# Patient Record
Sex: FEMALE | Race: WHITE
[De-identification: names, ages, dates, MRNs, and addresses within clinical notes are randomized per-mention and may not be internally consistent; named-entity substitution may affect disease eponyms.]

---

## 2017-04-25 ENCOUNTER — HOSPITAL ENCOUNTER (OUTPATIENT)
Dept: HOSPITAL 80 - FIMAGING | Age: 77
End: 2017-04-25
Attending: GENERAL ACUTE CARE HOSPITAL
Payer: COMMERCIAL

## 2017-04-25 DIAGNOSIS — Z12.31: Primary | ICD-10-CM

## 2017-04-25 PROCEDURE — G0202 SCR MAMMO BI INCL CAD: HCPCS

## 2017-11-05 ENCOUNTER — HOSPITAL ENCOUNTER (INPATIENT)
Dept: HOSPITAL 80 - FED | Age: 77
LOS: 5 days | Discharge: HOME HEALTH | DRG: 193 | End: 2017-11-10
Attending: INTERNAL MEDICINE | Admitting: INTERNAL MEDICINE
Payer: COMMERCIAL

## 2017-11-05 DIAGNOSIS — E11.9: ICD-10-CM

## 2017-11-05 DIAGNOSIS — E78.5: ICD-10-CM

## 2017-11-05 DIAGNOSIS — D63.8: ICD-10-CM

## 2017-11-05 DIAGNOSIS — E66.9: ICD-10-CM

## 2017-11-05 DIAGNOSIS — M81.0: ICD-10-CM

## 2017-11-05 DIAGNOSIS — J96.01: ICD-10-CM

## 2017-11-05 DIAGNOSIS — J10.00: Primary | ICD-10-CM

## 2017-11-05 DIAGNOSIS — J45.909: ICD-10-CM

## 2017-11-05 DIAGNOSIS — Z79.84: ICD-10-CM

## 2017-11-05 LAB
% IMMATURE GRANULYOCYTES: 0 % (ref 0–1.1)
ABSOLUTE IMMATURE GRANULOCYTES: 0 10^3/UL (ref 0–0.1)
ABSOLUTE NRBC COUNT: 0 10^3/UL (ref 0–0.01)
ADD DIFF?: NO
ADD MORPH?: NO
ADD SCAN?: NO
ANION GAP SERPL CALC-SCNC: 11 MEQ/L (ref 8–16)
APTT BLD: 29.2 SEC (ref 23–38)
ATYPICAL LYMPHOCYTE FLAG: 0 (ref 0–99)
BILIRUB SERPL-MCNC: 0.4 MG/DL (ref 0.1–1.4)
CALCIUM SERPL-MCNC: 9.1 MG/DL (ref 8.5–10.4)
CHLORIDE SERPL-SCNC: 102 MEQ/L (ref 97–110)
CO2 SERPL-SCNC: 25 MEQ/L (ref 22–31)
CREAT SERPL-MCNC: 0.9 MG/DL (ref 0.6–1)
ERYTHROCYTE [DISTWIDTH] IN BLOOD BY AUTOMATED COUNT: 12.4 % (ref 11.5–15.2)
FRAGMENT RBC FLAG: 0 (ref 0–99)
GFR SERPL CREATININE-BSD FRML MDRD: > 60 ML/MIN/{1.73_M2}
GLUCOSE SERPL-MCNC: 263 MG/DL (ref 70–100)
HCT VFR BLD CALC: 35.4 % (ref 38–47)
HGB BLD-MCNC: 11.8 G/DL (ref 12.6–16.3)
INR PPP: 0.98 (ref 0.83–1.16)
LEFT SHIFT FLG: 0 (ref 0–99)
LIPEMIA HEMOLYSIS FLAG: 80 (ref 0–99)
MCH RBC BLDCO QN: 32.3 PG (ref 27.9–34.1)
MCHC RBC AUTO-ENTMCNC: 33.3 G/DL (ref 32.4–36.7)
MCV RBC AUTO: 97 FL (ref 81.5–99.8)
NRBC-AUTO%: 0 % (ref 0–0.2)
PLATELET # BLD: 145 10^3/UL (ref 150–400)
PLATELET CLUMPS FLAG: 0 (ref 0–99)
PMV BLD AUTO: 11.7 FL (ref 8.7–11.7)
POTASSIUM SERPL-SCNC: 5 MEQ/L (ref 3.5–5.2)
PROTHROMBIN TIME: 12.9 SEC (ref 12–15)
RBC # BLD AUTO: 3.65 10^6/UL (ref 4.18–5.33)
SODIUM SERPL-SCNC: 138 MEQ/L (ref 134–144)
SPECIMEN HEMOLYSIS: 114
TROPONIN I SERPL-MCNC: < 0.012 NG/ML (ref 0–0.03)

## 2017-11-05 RX ADMIN — ACETAMINOPHEN PRN MG: 325 TABLET ORAL at 22:42

## 2017-11-05 RX ADMIN — SODIUM CHLORIDE SCH MLS: 900 INJECTION, SOLUTION INTRAVENOUS at 23:56

## 2017-11-05 NOTE — CPEKG
Heart Rate: 92

RR Interval: 652

P-R Interval: 148

QRSD Interval: 70

QT Interval: 356

QTC Interval: 441

P Axis: 47

QRS Axis: -14

T Wave Axis: 49

EKG Severity - NORMAL ECG -

EKG Impression: SINUS RHYTHM

Electronically Signed By: Kayla Arriola 05-Nov-2017 21:48:21

## 2017-11-06 LAB
ABSOLUTE NRBC COUNT: 0 10^3/UL (ref 0–0.01)
ADD DIFF?: YES
ADD MORPH?: NO
ADD SCAN?: NO
ANION GAP SERPL CALC-SCNC: 8 MEQ/L (ref 8–16)
ATYPICAL LYMPHOCYTE FLAG: 0 (ref 0–99)
CALCIUM SERPL-MCNC: 8.5 MG/DL (ref 8.5–10.4)
CHLORIDE SERPL-SCNC: 108 MEQ/L (ref 97–110)
CO2 SERPL-SCNC: 27 MEQ/L (ref 22–31)
COLOR UR: YELLOW
CREAT SERPL-MCNC: 0.8 MG/DL (ref 0.6–1)
ERYTHROCYTE [DISTWIDTH] IN BLOOD BY AUTOMATED COUNT: 12.6 % (ref 11.5–15.2)
FRAGMENT RBC FLAG: 0 (ref 0–99)
GFR SERPL CREATININE-BSD FRML MDRD: > 60 ML/MIN/{1.73_M2}
GLUCOSE SERPL-MCNC: 68 MG/DL (ref 70–100)
HCT VFR BLD CALC: 33.1 % (ref 38–47)
HGB BLD-MCNC: 10.6 G/DL (ref 12.6–16.3)
LEFT SHIFT FLG: 0 (ref 0–99)
LIPEMIA HEMOLYSIS FLAG: 80 (ref 0–99)
MCH RBC BLDCO QN: 31.8 PG (ref 27.9–34.1)
MCHC RBC AUTO-ENTMCNC: 32 G/DL (ref 32.4–36.7)
MCV RBC AUTO: 99.4 FL (ref 81.5–99.8)
NITRITE UR QL STRIP: NEGATIVE
NRBC-AUTO%: 0 % (ref 0–0.2)
PH UR STRIP: 5 [PH] (ref 5–7.5)
PLATELET # BLD EST: (no result) 10*3/UL
PLATELET # BLD: 128 10^3/UL (ref 150–400)
PLATELET CLUMPS FLAG: 0 (ref 0–99)
PMV BLD AUTO: 11.7 FL (ref 8.7–11.7)
POTASSIUM SERPL-SCNC: 4.3 MEQ/L (ref 3.5–5.2)
RBC # BLD AUTO: 3.33 10^6/UL (ref 4.18–5.33)
RBC #/AREA URNS HPF: (no result) /HPF (ref 0–3)
RBC MORPH BLD: NORMAL
SODIUM SERPL-SCNC: 143 MEQ/L (ref 134–144)
SP GR UR STRIP: 1.01 (ref 1–1.03)
WBC #/AREA URNS HPF: (no result) /HPF (ref 0–3)

## 2017-11-06 RX ADMIN — OSELTAMIVIR PHOSPHATE SCH MG: 6 POWDER, FOR SUSPENSION ORAL at 11:44

## 2017-11-06 RX ADMIN — SODIUM CHLORIDE SCH MLS: 900 INJECTION, SOLUTION INTRAVENOUS at 15:53

## 2017-11-06 RX ADMIN — ACETAMINOPHEN PRN MG: 325 TABLET ORAL at 20:03

## 2017-11-06 RX ADMIN — GLIMEPIRIDE SCH MG: 2 TABLET ORAL at 08:50

## 2017-11-06 RX ADMIN — BENZONATATE PRN MG: 100 CAPSULE, LIQUID FILLED ORAL at 19:52

## 2017-11-06 RX ADMIN — ENOXAPARIN SODIUM SCH MG: 100 INJECTION SUBCUTANEOUS at 08:49

## 2017-11-06 RX ADMIN — ATORVASTATIN CALCIUM SCH MG: 20 TABLET, FILM COATED ORAL at 20:03

## 2017-11-06 RX ADMIN — OSELTAMIVIR PHOSPHATE SCH MG: 6 POWDER, FOR SUSPENSION ORAL at 19:53

## 2017-11-06 NOTE — PDMN
Medical Necessity


Medical necessity: est los>2mn for viral URI, hypoxia, and SIRS criteria; 

requires  supplemental O2, PT/OT, follow cx's, IVF; comorbid DM, afib on AC, 

asthma, hld, cognitive  delay, hx MVR; per H&P and order 11/5/17

## 2017-11-06 NOTE — HOSPPROG
Hospitalist Progress Note


Assessment/Plan: 





#Influenza A: Tamiflu, cough suppressants





#Acute hypoxic resp failure: due to above. No PNA on xray. Albuterol





#Cognitive delay: have 24hr failure





#Type 2 DM: resume home meds





#Diet: regular





#Disp cont inpt admission with acute hypoxia, requiring pulse ox and nebs


Subjective: SOB, cough


Objective: 


 Vital Signs











Temp Pulse Resp BP Pulse Ox


 


 37.1 C   81   18   141/80 H  96 


 


 11/06/17 07:40  11/06/17 11:17  11/06/17 11:17  11/06/17 11:17  11/06/17 11:17








 Laboratory Results





 11/06/17 05:15 





 11/06/17 05:15 





 











 11/05/17 11/06/17 11/07/17





 05:59 05:59 05:59


 


Intake Total  1531 


 


Balance  1531 








 











PT  12.9 SEC (12.0-15.0)   11/05/17  19:30    


 


INR  0.98  (0.83-1.16)   11/05/17  19:30    














- Physical Exam


Constitutional: no apparent distress


Eyes: PERRL


Ears, Nose, Mouth, Throat: dry mucous membranes


Cardiovascular: regular rate and rhythym, no murmur, rub, or gallop


Respiratory: rhonchi


Gastrointestinal: normoactive bowel sounds


Genitourinary: no bladder fullness


Skin: warm


Musculoskeletal: full muscle strength


Neurologic: AAOx3


Psychiatric: interacting appropriately





ICD10 Worksheet


Patient Problems: 


 Problems











Problem Status Onset


 


Cough Acute  


 


Hypoxemia Acute  


 


Upper respiratory infection Acute  


 


Hypoglycemia Acute

## 2017-11-06 NOTE — GHP
[f 
rep st]



                                                            HISTORY AND PHYSICAL





DATE OF ADMISSION:  11/05/2017



SOURCE:  Patient provides history, is fairly reliable.  Family at bedside 
supplements history.  EMR was also reviewed and case discussed with ED provider.



HISTORY OF PRESENT ILLNESS:  This is a very pleasant 77-year-old female with 
past medical history significant for diabetes type 2, asthma, obesity, 
osteoporosis, hyperlipidemia, cognitive delay who presents to the emergency 
department today with complaints of 3 day history of cough, rhinorrhea, 
congestion, fevers up to 102 at her group home and some nausea without 
vomiting.  Patient denies any shortness of breath or audible wheezing.  She 
reports a cough occasionally productive of white phlegm.  She has multiple sick 
contacts at her group home with similar symptoms.  Patient has received her flu 
vaccine.  Patient normally does not require any oxygen; however, in the 
emergency department patient was noted to be 86% on room air in the triage.



REVIEW OF SYSTEMS:  Negative except as noted above in HPI.



ALLERGIES:  No known drug allergies.



HOME MEDICATIONS:  Tylenol 325 b.i.d. p.r.n., Metformin 1000 mg p.o. b.i.d. 
with meals, vitamin D3 1000 units p.o. daily, Fosamax 70 mg p.o. weekly on 
Monday at 7, Simvastatin 40 mg p.o. at h.s., multivitamin 1 tab p.o. daily, 
Amaryl 2 mg p.o. daily, Albuterol inhaler HFA 1-2 puffs p.o. q.4 hours p.r.n. 
for shortness of breath and magnesium and calcium supplements.



PAST MEDICAL HISTORY:  Significant for diabetes type 2, cognitive delay, 
hyperlipidemia, asthma, obesity with history of aspiration pneumonitis on past 
history stays.  This is secondary to history of esophageal ring status post 
dilation and skin cancer.



PAST SURGICAL HISTORY:  Significant for EGD with dilation.



FAMILY HISTORY:  Unknown.  Patient is adopted.



SOCIAL HISTORY:  Patient resides in a group home with caregivers.  She does use 
a walker at baseline but remains fairly independent otherwise.  She does not 
smoke, drink or do drugs.



CODE STATUS:  Full.



PHYSICAL EXAMINATION:  VITAL SIGNS:  On arrival to the ER: Blood pressure 114/68
, heart rate 103, respiratory rate 20, O2 sat 86% on room air, temperature 
38.1.  On the floor:  Blood pressure 115/64, heart rate 74, respiratory rate 16
, O2 98% on 2 L by nasal cannula with a temperature of 36.4. 

GENERAL:  No acute distress.  Pleasant, frail, clinically ill appearing elderly 
female is lying quietly in bed but easily wakes to name.  She is pleasant and 
cooperative.  HEAD:  Head normocephalic, atraumatic.  EYES:  Extraocular 
muscles grossly intact but exam is limited secondary to patient not being able 
to follow commands.  No scleral icterus or conjunctival injection.  Pupils are 
equal, reactive to light bilaterally and symmetric.  No scleral icterus or 
conjunctival injection.  ENT:  Mucous membranes appear slightly dry.  No 
oropharyngeal erythema or exudates.  NECK:  Supple.  Trachea midline.  CV:  
Regular rate and rhythm.  No murmurs, rubs or gallops appreciated.  RESPIRATORY
:  Lungs are clear to auscultation bilaterally.  No wheezes, rales or rhonchi.  
Patient with intermittent cough.  ABDOMEN:  Obese, soft, nontender to 
palpation.  No rebound, guarding or masses appreciated.  :  No suprapubic 
tenderness to palpation.  No Sanchez in place.  MUSCULOSKELETAL:  Patient with 
generalized weakness but able to move all extremities while lying in bed.  NEURO
:  Cranial nerve exam is limited secondary to patient's inability follow 
instructions but grossly normal.  No facial drooping.  Moves all extremities.  
Sensation intact upper and lower extremities.  PSYCH:  Patient is quite 
cooperative and pleasant.  She is awake, alert and oriented to person, place.  
She is aware of her current medical problems.



LABORATORY DATA:  WBC 3.52, H and H 11.4 and 35.4, platelet count is 145.  No 
bands.  PT is 12.9, INR 0.98, PTT is 29.2.  VBG lactic acid 1.8.  Sodium 138, 
potassium 5.0, chloride 102, CO2 is 25, BUN 24, creatinine 0.9, GFR of greater 
than 60, glucose 263, calcium 9.1, total bili 0.4, troponin is negative.  
Specimen is noted to be hemolyzed.  Flu AB is negative.  Respiratory panel is 
pending.  Blood cultures x2 pending.



IMAGING STUDIES:  

1.  Electrocardiogram reviewed myself showing sinus tachy in the 190s.  No 
acute ST changes.  QTc 441.  

2.  Chest x-ray image and report was reviewed myself showing mild dependent 
edema or atelectasis suspected of the lung bases, otherwise no active 
cardiopulmonary disease is seen.



ASSESSMENT AND PLAN:  A pleasant 77-year-old female with history of asthma, 
diabetes now with complaints of fevers, chills, cough and rhinorrhea.

1.  Viral URI.  Patient with multiple sick contacts.  PCR panel is still 
pending.  Blood cultures were ordered.  Patient without any evidence of 
pneumonia on chest x-ray.  Some dependent atelectasis is noted.  Supportive 
care will be started.  Patient will not be given any antibiotics at this time.  
She as continuous need for oxygen.

2.  Hypoxia.  Patient still requiring a few liters of oxygen supplementation 
secondary to viral illness as above.  She does not have any evidence of asthma 
exacerbation at this time.  There is no wheezing or diminished breath sounds.  
Continue titrating oxygen as tolerated.  Incentive spirometry will also be 
ordered.  

3.  SIRS.  Patient meets criteria with fever, tachycardia, tachypnea, 
leukopenia.  Patient's lactate is within normal limits, however, continue with 
some IV fluid hydration.  SIRS criteria has improved since arrival to the 
floor.  

4.  Anemia.  Likely of chronic disease.  Patient without any evidence of active 
bleeding.  Will continue to monitor CBC.

5.  Thrombocytopenia.  Likely related to acute illness.  Continue to monitor.

6.  Diabetes type 2 with elevated blood sugars.  Resume patient's Metformin and 
glimepiride.  For now she may require insulin sliding scale but will reassess 
in the morning.  Add on Accu-Cheks.  

7.  Asthma.  No evidence of exacerbation.  Continue with Albuterol nebulizer 
p.r.n.  

8.  Hyperlipidemia.  Continue statin.

9.  Cognitive delay.  Patient makes her own decisions but has support.

10.  Fluid, electrolyte and nutrition.  Continue with IV fluid overnight.  
Encourage oral hydration.  Electrolyte replacement if needed.  ADA diet has 
been ordered.

11.  Prophylaxis. SCDs. lovenox. 

12.  Code status is full.

13.  Disposition.  Patient will be admitted to inpatient status on the medical 
floor.  Patient with hypoxia and multiple chronic medical issues in addition to 
SIRS criteria.  Will continue to monitor blood cultures.  PT/OT will be ordered 
and will try to wean patient off the oxygen.  





Job #:  [id number]

MTDD

## 2017-11-07 LAB
ANION GAP SERPL CALC-SCNC: 10 MEQ/L (ref 8–16)
CALCIUM SERPL-MCNC: 8.2 MG/DL (ref 8.5–10.4)
CHLORIDE SERPL-SCNC: 110 MEQ/L (ref 97–110)
CO2 SERPL-SCNC: 24 MEQ/L (ref 22–31)
CREAT SERPL-MCNC: 0.8 MG/DL (ref 0.6–1)
GFR SERPL CREATININE-BSD FRML MDRD: > 60 ML/MIN/{1.73_M2}
GLUCOSE SERPL-MCNC: 63 MG/DL (ref 70–100)
POTASSIUM SERPL-SCNC: 4.3 MEQ/L (ref 3.5–5.2)
SODIUM SERPL-SCNC: 144 MEQ/L (ref 134–144)

## 2017-11-07 RX ADMIN — BENZONATATE PRN MG: 100 CAPSULE, LIQUID FILLED ORAL at 04:56

## 2017-11-07 RX ADMIN — OSELTAMIVIR PHOSPHATE SCH MG: 6 POWDER, FOR SUSPENSION ORAL at 18:24

## 2017-11-07 RX ADMIN — DOCUSATE SODIUM AND SENNOSIDES SCH TAB: 50; 8.6 TABLET ORAL at 19:55

## 2017-11-07 RX ADMIN — ATORVASTATIN CALCIUM SCH MG: 20 TABLET, FILM COATED ORAL at 19:55

## 2017-11-07 RX ADMIN — GLIMEPIRIDE SCH MG: 2 TABLET ORAL at 08:08

## 2017-11-07 RX ADMIN — SODIUM CHLORIDE SCH MLS: 900 INJECTION, SOLUTION INTRAVENOUS at 12:39

## 2017-11-07 RX ADMIN — SODIUM CHLORIDE SCH MLS: 900 INJECTION, SOLUTION INTRAVENOUS at 22:32

## 2017-11-07 RX ADMIN — OSELTAMIVIR PHOSPHATE SCH MG: 6 POWDER, FOR SUSPENSION ORAL at 08:08

## 2017-11-07 RX ADMIN — ENOXAPARIN SODIUM SCH MG: 100 INJECTION SUBCUTANEOUS at 08:08

## 2017-11-07 RX ADMIN — SODIUM CHLORIDE SCH MLS: 900 INJECTION, SOLUTION INTRAVENOUS at 02:25

## 2017-11-07 NOTE — PDMN
Medical Necessity


Medical necessity: addendum to 11/6/17 note: per edit to H&P per MD, pt does 

not have hx of afib and MVR.

## 2017-11-07 NOTE — HOSPPROG
Hospitalist Progress Note


Assessment/Plan: 





78 yo F with pmh of cognitive delay, DM2 presenting with influenza A and acute 

hypoxic respiratory failure





# acute hypoxic respiratory failure: in setting of influenza A infection, 

improving but still requiring 3 L of o2 to maintain o2 sats > 90%. She is not 

very mobile at this point and suspect atelectasis and immobility contributing. 

Will get repeat CXR in am for further evaluation. IS, oob to chair, ambulation. 


# influenza A: tamiflu to be continued, symptomatically slowly improving, still 

very fatigued and hypoxic as above


 # pancytopenia: mild and stable, anemia 2/2 anemia of chronic disease and 

suspect other counts are low due to acute infection


# cognitive delay: resides in a group home, here with caregiver


# DM2: well controlled, will continue current regimen


# dispo: IP status, would like to wean off of o2 prior to dc given mobility 

concerns


Patient new to my care. Old records reviewed and summarized as above. Care plan 

reviewed with patients home caregiver present at bedside. 


Subjective: no significant overnight events, patient still very somnolent but 

breathing better


Objective: 


 Vital Signs











Temp Pulse Resp BP Pulse Ox


 


 36.1 C   77   12   125/63 H  93 


 


 11/07/17 11:44  11/07/17 11:44  11/07/17 11:44  11/07/17 11:44  11/07/17 11:44








 Laboratory Results





 11/06/17 05:15 





 11/07/17 04:52 





 











 11/06/17 11/07/17 11/08/17





 05:59 05:59 05:59


 


Intake Total 1531 2094 426


 


Output Total   375


 


Balance 1531 2094 51








 











PT  12.9 SEC (12.0-15.0)   11/05/17  19:30    


 


INR  0.98  (0.83-1.16)   11/05/17  19:30    








awake alert nad


anicteric


op clear


rrr no mrg


cta dec at bases


soft nt nd


no cce


warm dry well perfused


oriented appropratie





ICD10 Worksheet


Patient Problems: 


 Problems











Problem Status Onset


 


Upper respiratory infection Acute  


 


Hypoglycemia Acute  


 


Hypoxemia Acute  


 


Cough Acute

## 2017-11-07 NOTE — ASMTCASEMG
Living Arrangements

 

What is your living           Answers:  With Other (Not Family)               

arrangement? Who do you                                                       

live with?                                                                    

Type Of Residence

 

What kind of residence do     Answers:  Group Home                            

you live in?                                                                  

Case Management Evaluation

 

Functional: Able to           Answers:  Yes                                   

return Home with Prior                                                        

Level of Function/Care                                                        

Discharge Plan Comments

 

Coordination Status           

Comments                      

Notes:

Patient will discharge to her group home with her care giver when medically stable. 

 

Date Signed:  11/07/2017 06:02 PM

Electronically Signed By:LYLY Albrecht

## 2017-11-08 RX ADMIN — ACETAMINOPHEN PRN MG: 325 TABLET ORAL at 16:08

## 2017-11-08 RX ADMIN — SODIUM CHLORIDE SCH MLS: 900 INJECTION, SOLUTION INTRAVENOUS at 08:02

## 2017-11-08 RX ADMIN — BENZONATATE PRN MG: 100 CAPSULE, LIQUID FILLED ORAL at 16:08

## 2017-11-08 RX ADMIN — ENOXAPARIN SODIUM SCH MG: 100 INJECTION SUBCUTANEOUS at 07:51

## 2017-11-08 RX ADMIN — OSELTAMIVIR PHOSPHATE SCH MG: 6 POWDER, FOR SUSPENSION ORAL at 10:09

## 2017-11-08 RX ADMIN — GLIMEPIRIDE SCH MG: 2 TABLET ORAL at 07:51

## 2017-11-08 RX ADMIN — DOCUSATE SODIUM AND SENNOSIDES SCH TAB: 50; 8.6 TABLET ORAL at 21:52

## 2017-11-08 RX ADMIN — DOCUSATE SODIUM AND SENNOSIDES SCH TAB: 50; 8.6 TABLET ORAL at 07:50

## 2017-11-08 RX ADMIN — OSELTAMIVIR PHOSPHATE SCH MG: 6 POWDER, FOR SUSPENSION ORAL at 18:29

## 2017-11-08 RX ADMIN — ATORVASTATIN CALCIUM SCH MG: 20 TABLET, FILM COATED ORAL at 21:52

## 2017-11-08 RX ADMIN — BENZONATATE PRN MG: 100 CAPSULE, LIQUID FILLED ORAL at 06:17

## 2017-11-08 NOTE — ASMTCMCOM
CM Note

 

CM Note                       

Notes:

PT is recommending HC PT for pt for balance issues. Discussed this with pt's caregiver Brenda 

([phone]) who is in agreement. [facility] alerted. DC unclear. C/M to follow.

 

Date Signed:  11/08/2017 11:18 AM

Electronically Signed By:Idania Shukla LCSW

## 2017-11-08 NOTE — HOSPPROG
Hospitalist Progress Note


Assessment/Plan: 





78 yo F with pmh of cognitive delay, DM2 presenting with influenza A and acute 

hypoxic respiratory failure





# acute hypoxic respiratory failure: in setting of influenza A infection, 

improving but still requiring 3 L of o2 to maintain o2 sats > 90%. Repeat cxr 

personally reviewed and notable for increased interstitial edema and stable to 

increased left basilar opacity that is slightly increased, suspect atelectasis 

rather than pna but monitoring closely. Will give lasix 20 x 1. Continue IS, 

ambulation. 


# influenza A: tamiflu to be continued, symptomatically improving, as above


 # pancytopenia: mild and stable, anemia 2/2 anemia of chronic disease and 

suspect other counts are low due to acute infection, will recheck in am


# cognitive delay: resides in a group home, will return there after dc


# DM2: well controlled, will continue current regimen


# dispo: IP status, would like to wean off of o2 prior to dc given mobility 

concerns


Care plan reviewed with CM. 


Subjective: no significant overnight events, patient currently feeling a bit 

better than yesterday, feels less sob, still requiring o2


Objective: 


 Vital Signs











Temp Pulse Resp BP Pulse Ox


 


 36.7 C   67   16   125/51 H  97 


 


 11/08/17 11:30  11/08/17 11:30  11/08/17 11:30  11/08/17 11:30  11/08/17 11:30








 Laboratory Results





 11/06/17 05:15 





 11/07/17 04:52 





 











 11/07/17 11/08/17 11/09/17





 05:59 05:59 05:59


 


Intake Total 2094 2516 


 


Output Total  725 200


 


Balance 2094 1791 -200








 











PT  12.9 SEC (12.0-15.0)   11/05/17  19:30    


 


INR  0.98  (0.83-1.16)   11/05/17  19:30    








awake alert nad


anicteric


op clear


rrr no mrg


cta dec at bases


soft nt nd


no cce


warm dry well perfused


oriented appropratie





ICD10 Worksheet


Patient Problems: 


 Problems











Problem Status Onset


 


Cough Acute  


 


Hypoxemia Acute  


 


Upper respiratory infection Acute  


 


Hypoglycemia Acute

## 2017-11-09 LAB
ERYTHROCYTE [DISTWIDTH] IN BLOOD BY AUTOMATED COUNT: 12.2 % (ref 11.5–15.2)
HCT VFR BLD CALC: 30.9 % (ref 38–47)
HGB BLD-MCNC: 9.9 G/DL (ref 12.6–16.3)
LIPEMIA HEMOLYSIS FLAG: 80 (ref 0–99)
MCH RBC BLDCO QN: 31.6 PG (ref 27.9–34.1)
MCHC RBC AUTO-ENTMCNC: 32 G/DL (ref 32.4–36.7)
MCV RBC AUTO: 98.7 FL (ref 81.5–99.8)
PLATELET # BLD: 129 10^3/UL (ref 150–400)
PLATELET CLUMPS FLAG: 10 (ref 0–99)
RBC # BLD AUTO: 3.13 10^6/UL (ref 4.18–5.33)

## 2017-11-09 RX ADMIN — DOCUSATE SODIUM AND SENNOSIDES SCH TAB: 50; 8.6 TABLET ORAL at 08:45

## 2017-11-09 RX ADMIN — BENZONATATE PRN MG: 100 CAPSULE, LIQUID FILLED ORAL at 09:23

## 2017-11-09 RX ADMIN — OSELTAMIVIR PHOSPHATE SCH MG: 6 POWDER, FOR SUSPENSION ORAL at 18:37

## 2017-11-09 RX ADMIN — ENOXAPARIN SODIUM SCH MG: 100 INJECTION SUBCUTANEOUS at 08:47

## 2017-11-09 RX ADMIN — BENZONATATE PRN MG: 100 CAPSULE, LIQUID FILLED ORAL at 18:43

## 2017-11-09 RX ADMIN — DOCUSATE SODIUM AND SENNOSIDES SCH: 50; 8.6 TABLET ORAL at 21:08

## 2017-11-09 RX ADMIN — BENZONATATE PRN MG: 100 CAPSULE, LIQUID FILLED ORAL at 21:02

## 2017-11-09 RX ADMIN — GLIMEPIRIDE SCH MG: 2 TABLET ORAL at 08:46

## 2017-11-09 RX ADMIN — OSELTAMIVIR PHOSPHATE SCH MG: 6 POWDER, FOR SUSPENSION ORAL at 08:47

## 2017-11-09 RX ADMIN — ATORVASTATIN CALCIUM SCH MG: 20 TABLET, FILM COATED ORAL at 21:02

## 2017-11-09 NOTE — HOSPPROG
Hospitalist Progress Note


Assessment/Plan: 





78 yo F with pmh of cognitive delay, DM2 presenting with influenza A and acute 

hypoxic respiratory failure





acute hypoxic respiratory failure: in setting of influenza A infection, 

improving but still requiring 2 L of o2 to maintain o2 sats > 90%. Repeat cxr 

personally reviewed and notable for increased interstitial edema and stable to 

increased left basilar opacity that is slightly increased, suspect atelectasis 

rather than pna but monitoring closely. 


   continue IS





influenza A: tamiflu to be continued, symptomatically improving, as above





pancytopenia: mild and stable, anemia 2/2 anemia of chronic disease and suspect 

other counts are low due to acute infection, will recheck in am


   stable, likely 2/2 viral illness





cognitive delay: resides in a group home, will return there after dc





DM2: well controlled, will continue current regimen





dispo: IP status, would like to wean off of o2 prior to dc given mobility 

concerns


Care plan reviewed with CM. 


Subjective: 02 requirement improving


Objective: 


 Vital Signs











Temp Pulse Resp BP Pulse Ox


 


 36.6 C   71   18   132/89 H  91 L


 


 11/09/17 08:00  11/09/17 08:00  11/09/17 08:00  11/09/17 08:00  11/09/17 08:00








 Laboratory Results





 11/09/17 04:05 





 11/07/17 04:52 





 











 11/08/17 11/09/17 11/10/17





 05:59 05:59 05:59


 


Intake Total 2516 650 


 


Output Total 725 1500 


 


Balance 1791 -850 








 











PT  12.9 SEC (12.0-15.0)   11/05/17  19:30    


 


INR  0.98  (0.83-1.16)   11/05/17  19:30    














- Physical Exam


Constitutional: no apparent distress, appears nourished


Eyes: PERRL, anicteric sclera


Ears, Nose, Mouth, Throat: moist mucous membranes, hearing normal


Cardiovascular: regular rate and rhythym, no murmur, rub, or gallop, No 

systolic murmur


Respiratory: no respiratory distress, other (scattered rhonchi. no wheeze, good 

air movement), No no rales or rhonchi


Gastrointestinal: normoactive bowel sounds, soft, non-tender abdomen


Genitourinary: no bladder fullness, No gold in urethra


Skin: warm, normal color


Musculoskeletal: full muscle strength, no muscle tenderness


Neurologic: AAOx3


Psychiatric: interacting appropriately





ICD10 Worksheet


Patient Problems: 


 Problems











Problem Status Onset


 


Cough Acute  


 


Hypoxemia Acute  


 


Upper respiratory infection Acute  


 


Hypoglycemia Acute

## 2017-11-10 VITALS — RESPIRATION RATE: 19 BRPM

## 2017-11-10 VITALS
DIASTOLIC BLOOD PRESSURE: 63 MMHG | SYSTOLIC BLOOD PRESSURE: 126 MMHG | HEART RATE: 76 BPM | OXYGEN SATURATION: 92 % | TEMPERATURE: 98.7 F

## 2017-11-10 RX ADMIN — ENOXAPARIN SODIUM SCH MG: 100 INJECTION SUBCUTANEOUS at 09:53

## 2017-11-10 RX ADMIN — OSELTAMIVIR PHOSPHATE SCH MG: 6 POWDER, FOR SUSPENSION ORAL at 08:11

## 2017-11-10 RX ADMIN — BENZONATATE PRN MG: 100 CAPSULE, LIQUID FILLED ORAL at 10:34

## 2017-11-10 RX ADMIN — GLIMEPIRIDE SCH MG: 2 TABLET ORAL at 09:25

## 2017-11-10 RX ADMIN — DOCUSATE SODIUM AND SENNOSIDES SCH TAB: 50; 8.6 TABLET ORAL at 09:23

## 2017-11-10 RX ADMIN — ACETAMINOPHEN PRN MG: 325 TABLET ORAL at 06:21

## 2017-11-10 NOTE — PDIAF
- Diagnosis


Diagnosis: influenza


Code Status: Full Code





- Medication Management


Discharge Medications: 


 Medications to Continue on Transfer





Alendronate Sodium [Fosamax 70 MG (*)] 70 mg PO MO@0700 09/20/12 [Last Taken 10/

30/17]


Multivitamins [Multivitamin (*)] 1 each PO DAILY 09/20/12 [Last Taken 11/05/17]


Cholecalciferol Vit D3 [Vitamin D3 (*)] 1,000 units PO DAILY 12/21/13 [Last 

Taken 11/05/17]


Glimepiride [Amaryl 2 MG (*)] 2 mg PO DAILY 12/21/13 [Last Taken 11/05/17]


Herbals/Supplements -Info Only 1 each PO AD 12/21/13 [Last Taken 12/20/13]


Simvastatin 40 mg PO HS 12/21/13 [Last Taken 11/05/17]


Acetaminophen [Tylenol 325mg (*)] 325 mg PO BID PRN 11/05/17 [Last Taken 11/05/ 17]


Albuterol [Proventil Inhaler HFA (*)] 1 - 2 puffs IH Q4H PRN 11/05/17 [Last 

Taken Unknown]


metFORMIN HCL [Metformin HCl] 1,000 mg PO BIDMEAL 11/05/17 [Last Taken 11/05/17 

18:00]








Discharge Medications: Refer to the Discharge Home Medication list for PRN 

reason.





- Orders


Services needed: Registered Nurse, Physical Therapy


Isolation Type: Droplet Isolation





- Follow Up Care


Current Providers and Referrals: 


Kristi Orozco MD [Primary Care Provider] - As per Instructions

## 2017-11-10 NOTE — ASMTCMCOM
CM Note

 

CM Note                       

Notes:

[facility] oxygen call for change of qualifying diagnosis. Kristopher Rosenbaum made appropriate changes in 

face to face. Order faxed to [facility] [phone]  Successfully.

 

Date Signed:  11/10/2017 04:39 PM

Electronically Signed By:Victorina Hernandez RN

## 2017-11-10 NOTE — PDHOMEO2F
Home Oxygen Face to Face


Home Orders: 


I certify that a physician or a nurse practitioner or physician's assistant has 

had a face-to-face encounter with this patient on the date of this order due to 

the diagnosis listed, which relates to the primary reason the patient requires 

home oxygen. Alternative treatments have been tried, or considered, and deemed 

ineffective. It is anticipated that supplemental oxygen will result in 

improvement with treatment.





Home oxygen qualifying diagnosis: influenza pneumonia


SpO2 on room air (%): 82


Frequency of home oxygen needed: continuous


Home oxygen liters per minute: 2


Home oxygen delivery device: nasal cannula


Concentrator: No


E-tanks for mobility and back up: No


I certify that, based on these findings, the home oxygen is medically necessary 

for this patient for the following length of time.





Length of time home oxygen needed: 1 week

## 2017-11-10 NOTE — GDS
[f rep st]



                                                             DISCHARGE SUMMARY





DISCHARGE DIAGNOSES:  

1.  Influenza pneumonia.  

2.  Acute hypoxemic respiratory failure.  

3.  Diabetes. 

4.  Cognitive delay.



HOSPITAL COURSE:  Please see Admission History and Physical by Dr. Beronica Johnson.  The patient present
s with 3-day history of cough, rhinorrhea, congestion, fever.  She had a chest x-ray that showed depe
ndent edema.  She was on 2 L of oxygen.  She was influenza positive.  She did not have sepsis. 



The patient was attempted to be weaned from oxygen but she was 82% on room air.  She attempted diures
is, however, did not have any impact on lowering her oxygen requirement.  She is discharged home with
 oxygen.  She completed her course of 5 days of Tamiflu, her blood cultures were negative.  She was d
ischarged home.  She was seen by Physical Therapy, who recommended home care.





Job #:  [id number]

## 2017-11-10 NOTE — HOSPPROG
Hospitalist Progress Note


Assessment/Plan: 





76 yo F with pmh of cognitive delay, DM2 presenting with influenza A and acute 

hypoxic respiratory failure





acute hypoxic respiratory failure: in setting of influenza A infection, 

improving but still requiring 2 L of o2 to maintain o2 sats > 90%. Repeat cxr 

personally reviewed and notable for increased interstitial edema and stable to 

increased left basilar opacity that is slightly increased, suspect atelectasis 

rather than pna but monitoring closely. 


   continue IS





influenza A: tamiflu to be continued, symptomatically improving, as above





hypoxemic respiratory failure: attributable to influenza pneumonia


    RA challenge today


   no response to lasix





pancytopenia: mild and stable, anemia 2/2 anemia of chronic disease and suspect 

other counts are low due to acute infection, will recheck in am


   stable, likely 2/2 viral illness





cognitive delay: resides in a group home, will return there after dc





DM2: well controlled, will continue current regimen





dispo: IP status, would like to wean off of o2 prior to dc given mobility 

concerns


Care plan reviewed with CM. 


Subjective: coughing


Objective: 


 Vital Signs











Temp Pulse Resp BP Pulse Ox


 


 36.9 C   61   19   138/68 H  94 


 


 11/10/17 10:37  11/10/17 10:37  11/10/17 10:37  11/10/17 10:37  11/10/17 10:37








 Laboratory Results





 11/09/17 04:05 





 11/07/17 04:52 





 











 11/09/17 11/10/17 11/11/17





 05:59 05:59 05:59


 


Intake Total 650  250


 


Output Total 1500 3 


 


Balance -850 -3 250








 











PT  12.9 SEC (12.0-15.0)   11/05/17  19:30    


 


INR  0.98  (0.83-1.16)   11/05/17  19:30    














- Physical Exam


Constitutional: no apparent distress, appears nourished


Eyes: PERRL, anicteric sclera


Ears, Nose, Mouth, Throat: moist mucous membranes, hearing normal


Cardiovascular: regular rate and rhythym, no murmur, rub, or gallop


Respiratory: no respiratory distress, No no rales or rhonchi


Gastrointestinal: normoactive bowel sounds, soft, non-tender abdomen


Genitourinary: no bladder fullness, No gold in urethra


Skin: warm, normal color


Musculoskeletal: full muscle strength


Neurologic: AAOx3





ICD10 Worksheet


Patient Problems: 


 Problems











Problem Status Onset


 


Cough Acute  


 


Hypoxemia Acute  


 


Upper respiratory infection Acute  


 


Hypoglycemia Acute

## 2017-11-10 NOTE — ASDISCHSUM
----------------------------------------------

Discharge Information

----------------------------------------------

Plan Status:Group Home                               Medically Cleared to Leave:11/09/2017

Discharge Date:11/10/2017 02:30 PM                   CM D/C Disposition:Home Health Service

ADT D/C Disposition:Home Health Service              Projected Discharge Date:11/10/2017 11:00 AM

Transportation at D/C:                               Discharge Delay Reason:

Follow-Up Date:11/10/2017 11:00 AM                   Discharge Slot:

Final Diagnosis:Cough, fever

----------------------------------------------

Placement Information

----------------------------------------------

Referral Type:*Home Health Care Services             Referral ID:[id number]

Provider Name:[facility]

Address [address] [address]                  Phone [phone]

Address 2:                                           Fax [phone]

City:[place]                                         Selection Factors:

State:[place]

 

----------------------------------------------

Patient Contact Information

----------------------------------------------

Contact Name:PETRONA                            Relationship:Other

Address:[address]                             Home [phone]

                                                     Work Phone:

City:[place]                                         Alternate Phone:

State/Zip Code:[place] [address]                              Email:

----------------------------------------------

Financial Information

----------------------------------------------

Financial Class:

Primary Plan Desc:MEDICARE INPATIENT                 Primary Plan Number:[id number]

Secondary Plan Desc:                                 Secondary Plan Number:

 

 

----------------------------------------------

Assessment Information

----------------------------------------------

----------------------------------------------

[facility] Initial CM Assessment

----------------------------------------------

Living Arrangements

 

What is your living           Answers:  With Other (Not Family)               

arrangement? Who do you                                                       

live with?                                                                    

Type Of Residence

 

What kind of residence do     Answers:  Group Home                            

you live in?                                                                  

Case Management Evaluation

 

Functional: Able to           Answers:  Yes                                   

return Home with Prior                                                        

Level of Function/Care                                                        

Discharge Plan Comments

 

Coordination Status           

Comments                      

Notes:

Patient will discharge to her group home with her care giver when medically stable. 

 

Date Signed:  11/07/2017 06:02 PM

Electronically Signed By:LYLY Albrecht

 

 

----------------------------------------------

[facility] CM Progress Note

----------------------------------------------

CM Note

 

CM Note                       

Notes:

PT is recommending HC PT for pt for balance issues. Discussed this with pt's caregiver Brenda 

([phone]) who is in agreement. [facility] alerted. FERNIE davila. C/M to follow.

 

Date Signed:  11/08/2017 11:18 AM

Electronically Signed By:Idania Shukla LCSW

 

 

----------------------------------------------

LACE

----------------------------------------------

LACE

 

Length of stay for            Answers:  4-6 days                              

current admission                                                             

Acuity / Level of Care        Answers:  Was the patient admitted              

                                        to hospital via the                   

                                        emergency                             

                                        department? Yes:                      

Comorbidities - select        Answers:  Diabetes without                      

all that apply                          complications                         

Emergency dept visits in      Answers:  1                                     

last 6 months                                                                 

Score: 9

 

Date Signed:  11/09/2017 09:40 AM

Electronically Signed By:Victorina Hernandez RN

 

 

----------------------------------------------

[facility] CM Progress Note

----------------------------------------------

CM Note

 

CM Note                       

Notes:

[facility] call for change of qualifying diagnosis. Kristopher Rosenbaum made appropriate changes in 

face to face. Order faxed to [facility] [phone]  Successfully.

 

Date Signed:  11/10/2017 04:39 PM

Electronically Signed By:Victorina Hernandez RN

 

 

----------------------------------------------

Intervention Information

----------------------------------------------

## 2018-05-01 ENCOUNTER — HOSPITAL ENCOUNTER (OUTPATIENT)
Dept: HOSPITAL 80 - FIMAGING | Age: 78
End: 2018-05-01
Attending: FAMILY MEDICINE
Payer: COMMERCIAL

## 2018-05-01 DIAGNOSIS — Z12.31: Primary | ICD-10-CM

## 2022-01-11 NOTE — EDPHY
H & P


Stated Complaint: COUGH/PRODUCTIVE/FEVER N/V


Time Seen by Provider: 11/05/17 19:25


HPI/ROS: 


CHIEF COMPLAINT:  Diarrhea, headache, cough





HISTORY OF PRESENT ILLNESS:   


The patient is a 76 y/o female complaining of diarrhea since Thursday, 3 days 

ago. On Friday she developed a headache. Yesterday she developed a cough with 

clear sputum. Her caregiver notes she had a fever of 102. She is also nauseous 

but had no vomiting. Denies history of blood clots or COPD.  No recent 

hospitalization or admission to skilled nursing facility or nursing home. She 

did receive a flu vaccine this year.  Noted to be hypoxic on arrival to the 

emergency department.  No prior history of O2 dependency.





No chills, chest pain, shortness of breath, palpitations, vomiting, urinary 

complaints, lightheadedness. 





REVIEW OF SYSTEMS:  Review of systems is unobtainable from this patient because 

of developmental delay.  Majority of the ROS and HPI obtained from the 

caregiver.





PAST MEDICAL HISTORY:  Asthma, diabetes, mentally delayed





SOCIAL HISTORY: Caregiver at bedside, lives in [place], non-smoker   





VITAL SIGNS: O2Sat: 86 HR: 103, Temp: 38.1 others reviewed by me


GENERAL: Well-developed, well-nourished, resting comfortably in no respiratory 

distress.


HEENT: Atraumatic. Eyes: No icterus, no injection. Mouth: dry mucous membranes. 

Posterior pharynx erythema, no lesions. Neck: supple with no adenopathy.


LUNGS: Left base wheezes and rhonchi that clear with coughing, faint rales at 

right base


CARDIAC: Regular rate and rhythm, no rubs, murmurs or gallops.


ABDOMEN: Soft, nontender, nondistended, bowel sounds normal.


BACK:  No CVA tenderness.


EXTREMITIES: No trauma. No edema.  Range of motion is normal throughout.


NEURO: Alert and oriented,  grossly nonfocal.  


SKIN: Slightly cool lower extremities, dry, no rash.


PSYCHIATRIC: Normal mentation, no agitation.





Portions of this note were transcribed by a medical scribe.  I personally 

performed a history, physical exam, medical decision making, and confirmed 

accuracy of information the transcribed note.





- Personal History


Current Tetanus/Diphtheria Vaccine: Yes


Current Tetanus Diphtheria and Acellular Pertussis (TDAP): Yes





- Medical/Surgical History


Hx Asthma: No


Hx Chronic Respiratory Disease: No


Hx Diabetes: Yes


Hx Cardiac Disease: No


Hx Renal Disease: No


Hx Cirrhosis: No


Hx Alcoholism: No


Hx HIV/AIDS: No


Hx Splenectomy or Spleen Trauma: No


Other PMH: Mentally delayed/DIABETIC





- Social History


Smoking Status: Never smoked


Constitutional: 


 Initial Vital Signs











Temperature (C)  38.1 C   11/05/17 18:54


 


Heart Rate  103 H  11/05/17 18:54


 


Respiratory Rate  20   11/05/17 18:54


 


Blood Pressure  114/68   11/05/17 18:54


 


O2 Sat (%)  86 L  11/05/17 18:54








 











O2 Delivery Mode               Nasal Cannula


 


O2 (L/minute)                  3














Allergies/Adverse Reactions: 


 





No Known Allergies Allergy (Verified 12/21/13 17:13)


 








Home Medications: 














 Medication  Instructions  Recorded


 


Alendronate Sodium [Fosamax 70 MG 70 mg PO MO@0700 09/20/12





(RX)]  


 


Multivitamins [Multivitamin (OTC)] 1 each PO DAILY 09/20/12


 


Cholecalciferol Vit D3 [Vitamin D3 1,000 units PO DAILY 12/21/13





1000 units (OTC)]  


 


Glimepiride [Amaryl 2 MG (RX)] 2 mg PO DAILY 12/21/13


 


Herbals/Supplements -Info Only 1 each PO AD 12/21/13


 


Simvastatin 40 mg PO HS 12/21/13


 


Acetaminophen [Tylenol 325mg (*)] 325 mg PO BID PRN 11/05/17


 


Albuterol [Proventil Inhaler HFA 1 - 2 puffs IH Q4H PRN 11/05/17





(*)]  


 


metFORMIN HCL [Metformin HCl] 1,000 mg PO BIDMEAL 11/05/17














Medical Decision Making





- Diagnostics


EKG Interpretation: 





12-LEAD EKG:  Please see the full report in [facility].  My interpretation:  

Sinus rhythm


Imaging Results: 


 Imaging Impressions





Chest X-Ray  11/05/17 19:42


Impression:


1. Mild dependent edema or atelectasis suspected at the lung bases. Otherwise 

no active cardiopulmonary disease seen.











Imaging: I viewed and interpreted images myself


ED Course/Re-evaluation: 


The patient is a 76 y/o female presenting with a fever, hypoxemia and 

tachycardia. On exam she has pharyngeal erythema, dry mucous membranes and left 

base wheezes and rhonchi that clear with coughing as well as faint rales at the 

right base.





2019: Reviewed chest x-ray. There is mild patchiness in the lower bases, no 

definitive infiltrate. Her respiratory pathogen is pending. She will need to be 

admitted for hypoxemia and cough. 





Sepsis Evaluation Note:


The patient presents to the ED with potential infection identified as pneumonia 

or pulmonary infection.  The patient did have evidence of sepsis with heart 

rate greater than 90, and WBC less than 4000.


Patient did not have evidence of end-organ dysfunction or severe sepsis.


Differential Diagnosis: 





Differential diagnosis for fever in adults and hypoxemia was considered 

including but not limited to pneumonia, influenza, pulmonary embolism, COPD 

exacerbation, urinary tract infection, viral syndrome, and influenza.


Consult/Admit Bed Type: Dr Stephen, [facility] surg





- Data Points


Laboratory Results: 


 Laboratory Results





 11/05/17 19:30 





 11/05/17 19:30 





 











  11/05/17 11/05/17 11/05/17





  20:30 19:30 19:30


 


WBC      





    


 


RBC      





    


 


Hgb      





    


 


Hct      





    


 


MCV      





    


 


MCH      





    


 


MCHC      





    


 


RDW      





    


 


Plt Count      





    


 


MPV      





    


 


Neut % (Auto)      





    


 


Lymph % (Auto)      





    


 


Mono % (Auto)      





    


 


Eos % (Auto)      





    


 


Baso % (Auto)      





    


 


Nucleat RBC Rel Count      





    


 


Absolute Neuts (auto)      





    


 


Absolute Lymphs (auto)      





    


 


Absolute Monos (auto)      





    


 


Absolute Eos (auto)      





    


 


Absolute Basos (auto)      





    


 


Absolute Nucleated RBC      





    


 


Immature Gran %      





    


 


Immature Gran #      





    


 


PT      12.9 SEC SEC





     (12.0-15.0) 


 


INR      0.98 





     (0.83-1.16) 


 


APTT      29.2 SEC SEC





     (23.0-38.0) 


 


VBG Lactic Acid      





    


 


Sodium    138 mEq/L mEq/L  





    (134-144)  


 


Potassium    5.0 mEq/L mEq/L  





    (3.5-5.2)  


 


Chloride    102 mEq/L mEq/L  





    ()  


 


Carbon Dioxide    25 mEq/l mEq/l  





    (22-31)  


 


Anion Gap    11 mEq/L mEq/L  





    (8-16)  


 


BUN    25 mg/dL H mg/dL  





    (7-23)  


 


Creatinine    0.9 mg/dL mg/dL  





    (0.6-1.0)  


 


Estimated GFR    > 60   





    


 


Glucose    263 mg/dL H mg/dL  





    ()  


 


Calcium    9.1 mg/dL mg/dL  





    (8.5-10.4)  


 


Total Bilirubin    0.4 mg/dL mg/dL  





    (0.1-1.4)  


 


Troponin I    < 0.012 ng/mL ng/mL  





    (0.000-0.034)  


 


Specimen Hemolysis    114   





    


 


Nasal Influenza A PCR  NEGATIVE FOR FLU A     





   (NEGATIVE)   


 


Nasal Influenza B PCR  NEGATIVE FOR FLU B     





   (NEGATIVE)   














  11/05/17 11/05/17





  19:30 19:30


 


WBC  3.52 10^3/uL L 10^3/uL  





   (3.80-9.50)  


 


RBC  3.65 10^6/uL L 10^6/uL  





   (4.18-5.33)  


 


Hgb  11.8 g/dL L g/dL  





   (12.6-16.3)  


 


Hct  35.4 % L %  





   (38.0-47.0)  


 


MCV  97.0 fL fL  





   (81.5-99.8)  


 


MCH  32.3 pg pg  





   (27.9-34.1)  


 


MCHC  33.3 g/dL g/dL  





   (32.4-36.7)  


 


RDW  12.4 % %  





   (11.5-15.2)  


 


Plt Count  145 10^3/uL L 10^3/uL  





   (150-400)  


 


MPV  11.7 fL fL  





   (8.7-11.7)  


 


Neut % (Auto)  67.0 % %  





   (39.3-74.2)  


 


Lymph % (Auto)  17.9 % %  





   (15.0-45.0)  


 


Mono % (Auto)  14.8 % H %  





   (4.5-13.0)  


 


Eos % (Auto)  0.0 % L %  





   (0.6-7.6)  


 


Baso % (Auto)  0.3 % %  





   (0.3-1.7)  


 


Nucleat RBC Rel Count  0.0 % %  





   (0.0-0.2)  


 


Absolute Neuts (auto)  2.36 10^3/uL 10^3/uL  





   (1.70-6.50)  


 


Absolute Lymphs (auto)  0.63 10^3/uL L 10^3/uL  





   (1.00-3.00)  


 


Absolute Monos (auto)  0.52 10^3/uL 10^3/uL  





   (0.30-0.80)  


 


Absolute Eos (auto)  0.00 10^3/uL L 10^3/uL  





   (0.03-0.40)  


 


Absolute Basos (auto)  0.01 10^3/uL L 10^3/uL  





   (0.02-0.10)  


 


Absolute Nucleated RBC  0.00 10^3/uL 10^3/uL  





   (0-0.01)  


 


Immature Gran %  0.0 % %  





   (0.0-1.1)  


 


Immature Gran #  0.00 10^3/uL 10^3/uL  





   (0.00-0.10)  


 


PT    





   


 


INR    





   


 


APTT    





   


 


VBG Lactic Acid    1.8 mmol/L mmol/L





    (0.7-2.1) 


 


Sodium    





   


 


Potassium    





   


 


Chloride    





   


 


Carbon Dioxide    





   


 


Anion Gap    





   


 


BUN    





   


 


Creatinine    





   


 


Estimated GFR    





   


 


Glucose    





   


 


Calcium    





   


 


Total Bilirubin    





   


 


Troponin I    





   


 


Specimen Hemolysis    





   


 


Nasal Influenza A PCR    





   


 


Nasal Influenza B PCR    





   











Medications Given: 


 





Acetaminophen (Tylenol)  650 mg PO Q4HRS PRN


   PRN Reason: Pain, Mild/Fever, Can Take PO


   Stop: 05/04/18 22:26


   Last Admin: 11/05/17 22:42 Dose:  650 mg


Sodium Chloride (Ns)  1,700 mls @ 283.3333 mls/hr 30 ml/kg infuse over 6 hr (

1700 ml) IV EDNOW ONE


   PRN Reason: Protocol


   Stop: 11/06/17 02:46


   Last Admin: 11/05/17 20:51 Dose:  1,700 mls





Discontinued Medications





Acetaminophen (Tylenol)  650 mg PO EDNOW ONE


   Stop: 11/05/17 21:21


   Last Admin: 11/05/17 22:49 Dose:  Not Given


Albuterol/Ipratropium (Duoneb)  3 ml IH EDNOW ONE


   Stop: 11/05/17 20:20


   Last Admin: 11/05/17 20:51 Dose:  3 ml








Departure





- Departure


Disposition: [facility]


Clinical Impression: 


 Hypoxemia, Cough





Upper respiratory infection


Qualifiers:


 URI type: unspecified URI Qualified Code(s): J06.9 - Acute upper respiratory 

infection, unspecified





Condition: Fair


Report Scribed for: Kayla Arriola


Report Scribed by: Bia Granado


Date of Report: 11/05/17


Time of Report: 19:39 [email or web address]